# Patient Record
Sex: FEMALE | Race: BLACK OR AFRICAN AMERICAN | NOT HISPANIC OR LATINO | URBAN - METROPOLITAN AREA
[De-identification: names, ages, dates, MRNs, and addresses within clinical notes are randomized per-mention and may not be internally consistent; named-entity substitution may affect disease eponyms.]

---

## 2023-12-03 DIAGNOSIS — M25.831 ULNAR ABUTMENT SYNDROME, RIGHT: ICD-10-CM

## 2023-12-04 PROBLEM — M25.831 ULNAR ABUTMENT SYNDROME, RIGHT: Status: ACTIVE | Noted: 2023-12-03

## 2023-12-13 ENCOUNTER — ANESTHESIA EVENT (OUTPATIENT)
Dept: OPERATING ROOM | Facility: HOSPITAL | Age: 48
End: 2023-12-13
Payer: COMMERCIAL

## 2023-12-14 ENCOUNTER — ANESTHESIA (OUTPATIENT)
Dept: OPERATING ROOM | Facility: HOSPITAL | Age: 48
End: 2023-12-14
Payer: COMMERCIAL

## 2023-12-14 ENCOUNTER — APPOINTMENT (OUTPATIENT)
Dept: RADIOLOGY | Facility: HOSPITAL | Age: 48
End: 2023-12-14
Payer: COMMERCIAL

## 2023-12-14 ENCOUNTER — HOSPITAL ENCOUNTER (OUTPATIENT)
Facility: HOSPITAL | Age: 48
Setting detail: OUTPATIENT SURGERY
Discharge: HOME | End: 2023-12-14
Attending: ORTHOPAEDIC SURGERY | Admitting: ORTHOPAEDIC SURGERY
Payer: COMMERCIAL

## 2023-12-14 VITALS
HEART RATE: 82 BPM | HEIGHT: 61 IN | WEIGHT: 197.97 LBS | RESPIRATION RATE: 14 BRPM | BODY MASS INDEX: 37.38 KG/M2 | SYSTOLIC BLOOD PRESSURE: 135 MMHG | TEMPERATURE: 97.3 F | DIASTOLIC BLOOD PRESSURE: 74 MMHG | OXYGEN SATURATION: 98 %

## 2023-12-14 DIAGNOSIS — M25.831 ULNAR ABUTMENT SYNDROME, RIGHT: Primary | ICD-10-CM

## 2023-12-14 DIAGNOSIS — G89.18 POST-OP PAIN: ICD-10-CM

## 2023-12-14 PROBLEM — E66.9 OBESITY: Status: ACTIVE | Noted: 2023-12-14

## 2023-12-14 PROCEDURE — 76000 FLUOROSCOPY <1 HR PHYS/QHP: CPT | Mod: RT

## 2023-12-14 PROCEDURE — 7100000001 HC RECOVERY ROOM TIME - INITIAL BASE CHARGE: Performed by: ORTHOPAEDIC SURGERY

## 2023-12-14 PROCEDURE — 64417 NJX AA&/STRD AX NERVE IMG: CPT | Performed by: ANESTHESIOLOGY

## 2023-12-14 PROCEDURE — 25390 SHORTEN RADIUS OR ULNA: CPT | Performed by: ORTHOPAEDIC SURGERY

## 2023-12-14 PROCEDURE — 3700000001 HC GENERAL ANESTHESIA TIME - INITIAL BASE CHARGE: Performed by: ORTHOPAEDIC SURGERY

## 2023-12-14 PROCEDURE — 2500000004 HC RX 250 GENERAL PHARMACY W/ HCPCS (ALT 636 FOR OP/ED): Performed by: ORTHOPAEDIC SURGERY

## 2023-12-14 PROCEDURE — 7100000002 HC RECOVERY ROOM TIME - EACH INCREMENTAL 1 MINUTE: Performed by: ORTHOPAEDIC SURGERY

## 2023-12-14 PROCEDURE — 2500000004 HC RX 250 GENERAL PHARMACY W/ HCPCS (ALT 636 FOR OP/ED): Performed by: NURSE ANESTHETIST, CERTIFIED REGISTERED

## 2023-12-14 PROCEDURE — 2780000003 HC OR 278 NO HCPCS: Performed by: ORTHOPAEDIC SURGERY

## 2023-12-14 PROCEDURE — 2500000004 HC RX 250 GENERAL PHARMACY W/ HCPCS (ALT 636 FOR OP/ED): Performed by: ANESTHESIOLOGY

## 2023-12-14 PROCEDURE — 7100000009 HC PHASE TWO TIME - INITIAL BASE CHARGE: Performed by: ORTHOPAEDIC SURGERY

## 2023-12-14 PROCEDURE — 3700000002 HC GENERAL ANESTHESIA TIME - EACH INCREMENTAL 1 MINUTE: Performed by: ORTHOPAEDIC SURGERY

## 2023-12-14 PROCEDURE — A25390 PR OSTEOPLASTY,RADIUS OR ULNA,SHORTEN: Performed by: NURSE ANESTHETIST, CERTIFIED REGISTERED

## 2023-12-14 PROCEDURE — 2500000001 HC RX 250 WO HCPCS SELF ADMINISTERED DRUGS (ALT 637 FOR MEDICARE OP): Performed by: ANESTHESIOLOGY

## 2023-12-14 PROCEDURE — 3600000008 HC OR TIME - EACH INCREMENTAL 1 MINUTE - PROCEDURE LEVEL THREE: Performed by: ORTHOPAEDIC SURGERY

## 2023-12-14 PROCEDURE — 7100000010 HC PHASE TWO TIME - EACH INCREMENTAL 1 MINUTE: Performed by: ORTHOPAEDIC SURGERY

## 2023-12-14 PROCEDURE — A4217 STERILE WATER/SALINE, 500 ML: HCPCS | Performed by: ORTHOPAEDIC SURGERY

## 2023-12-14 PROCEDURE — 64415 NJX AA&/STRD BRCH PLXS IMG: CPT | Performed by: ANESTHESIOLOGY

## 2023-12-14 PROCEDURE — 3600000003 HC OR TIME - INITIAL BASE CHARGE - PROCEDURE LEVEL THREE: Performed by: ORTHOPAEDIC SURGERY

## 2023-12-14 PROCEDURE — C1713 ANCHOR/SCREW BN/BN,TIS/BN: HCPCS | Performed by: ORTHOPAEDIC SURGERY

## 2023-12-14 PROCEDURE — 2720000007 HC OR 272 NO HCPCS: Performed by: ORTHOPAEDIC SURGERY

## 2023-12-14 PROCEDURE — A25390 PR OSTEOPLASTY,RADIUS OR ULNA,SHORTEN: Performed by: ANESTHESIOLOGY

## 2023-12-14 DEVICE — PLATE TACK: Type: IMPLANTABLE DEVICE | Site: WRIST | Status: NON-FUNCTIONAL

## 2023-12-14 DEVICE — IMPLANTABLE DEVICE: Type: IMPLANTABLE DEVICE | Site: WRIST | Status: FUNCTIONAL

## 2023-12-14 DEVICE — SCREW, HEXALOBE, NON LOCKING, 3.5MM X 14MM: Type: IMPLANTABLE DEVICE | Site: WRIST | Status: FUNCTIONAL

## 2023-12-14 DEVICE — GUIDEWIRE, .054 X 6 IN: Type: IMPLANTABLE DEVICE | Site: WRIST | Status: NON-FUNCTIONAL

## 2023-12-14 DEVICE — SCREW, HEXALOBE, LOCKING, 3.5MM X 12MM: Type: IMPLANTABLE DEVICE | Site: WRIST | Status: FUNCTIONAL

## 2023-12-14 RX ORDER — PROPOFOL 10 MG/ML
INJECTION, EMULSION INTRAVENOUS AS NEEDED
Status: DISCONTINUED | OUTPATIENT
Start: 2023-12-14 | End: 2023-12-14

## 2023-12-14 RX ORDER — ONDANSETRON HYDROCHLORIDE 2 MG/ML
INJECTION, SOLUTION INTRAVENOUS AS NEEDED
Status: DISCONTINUED | OUTPATIENT
Start: 2023-12-14 | End: 2023-12-14

## 2023-12-14 RX ORDER — FENTANYL CITRATE 50 UG/ML
INJECTION, SOLUTION INTRAMUSCULAR; INTRAVENOUS AS NEEDED
Status: DISCONTINUED | OUTPATIENT
Start: 2023-12-14 | End: 2023-12-14

## 2023-12-14 RX ORDER — MIDAZOLAM HYDROCHLORIDE 1 MG/ML
INJECTION, SOLUTION INTRAMUSCULAR; INTRAVENOUS AS NEEDED
Status: DISCONTINUED | OUTPATIENT
Start: 2023-12-14 | End: 2023-12-14

## 2023-12-14 RX ORDER — SODIUM CHLORIDE, SODIUM LACTATE, POTASSIUM CHLORIDE, CALCIUM CHLORIDE 600; 310; 30; 20 MG/100ML; MG/100ML; MG/100ML; MG/100ML
50 INJECTION, SOLUTION INTRAVENOUS CONTINUOUS
Status: DISCONTINUED | OUTPATIENT
Start: 2023-12-14 | End: 2023-12-14 | Stop reason: HOSPADM

## 2023-12-14 RX ORDER — HYDRALAZINE HYDROCHLORIDE 20 MG/ML
5 INJECTION INTRAMUSCULAR; INTRAVENOUS EVERY 30 MIN PRN
Status: DISCONTINUED | OUTPATIENT
Start: 2023-12-14 | End: 2023-12-14 | Stop reason: HOSPADM

## 2023-12-14 RX ORDER — SODIUM CHLORIDE 0.9 G/100ML
IRRIGANT IRRIGATION AS NEEDED
Status: DISCONTINUED | OUTPATIENT
Start: 2023-12-14 | End: 2023-12-14 | Stop reason: HOSPADM

## 2023-12-14 RX ORDER — PROPOFOL 10 MG/ML
INJECTION, EMULSION INTRAVENOUS CONTINUOUS PRN
Status: DISCONTINUED | OUTPATIENT
Start: 2023-12-14 | End: 2023-12-14

## 2023-12-14 RX ORDER — SODIUM CHLORIDE, SODIUM LACTATE, POTASSIUM CHLORIDE, CALCIUM CHLORIDE 600; 310; 30; 20 MG/100ML; MG/100ML; MG/100ML; MG/100ML
100 INJECTION, SOLUTION INTRAVENOUS CONTINUOUS
Status: DISCONTINUED | OUTPATIENT
Start: 2023-12-14 | End: 2023-12-14 | Stop reason: HOSPADM

## 2023-12-14 RX ORDER — KETOROLAC TROMETHAMINE 30 MG/ML
INJECTION, SOLUTION INTRAMUSCULAR; INTRAVENOUS AS NEEDED
Status: DISCONTINUED | OUTPATIENT
Start: 2023-12-14 | End: 2023-12-14

## 2023-12-14 RX ORDER — OXYCODONE HYDROCHLORIDE 5 MG/1
5 TABLET ORAL EVERY 4 HOURS PRN
Status: DISCONTINUED | OUTPATIENT
Start: 2023-12-14 | End: 2023-12-14 | Stop reason: HOSPADM

## 2023-12-14 RX ORDER — DEXAMETHASONE SODIUM PHOSPHATE 4 MG/ML
INJECTION, SOLUTION INTRA-ARTICULAR; INTRALESIONAL; INTRAMUSCULAR; INTRAVENOUS; SOFT TISSUE AS NEEDED
Status: DISCONTINUED | OUTPATIENT
Start: 2023-12-14 | End: 2023-12-14

## 2023-12-14 RX ORDER — ONDANSETRON HYDROCHLORIDE 2 MG/ML
4 INJECTION, SOLUTION INTRAVENOUS ONCE AS NEEDED
Status: DISCONTINUED | OUTPATIENT
Start: 2023-12-14 | End: 2023-12-14 | Stop reason: HOSPADM

## 2023-12-14 RX ORDER — CEFAZOLIN SODIUM 2 G/100ML
2 INJECTION, SOLUTION INTRAVENOUS ONCE
Status: DISCONTINUED | OUTPATIENT
Start: 2023-12-14 | End: 2023-12-14 | Stop reason: HOSPADM

## 2023-12-14 RX ORDER — OXYCODONE HYDROCHLORIDE 5 MG/1
5 TABLET ORAL EVERY 6 HOURS PRN
Qty: 28 TABLET | Refills: 0 | Status: SHIPPED | OUTPATIENT
Start: 2023-12-14 | End: 2023-12-21

## 2023-12-14 RX ORDER — CEFAZOLIN 1 G/1
INJECTION, POWDER, FOR SOLUTION INTRAVENOUS AS NEEDED
Status: DISCONTINUED | OUTPATIENT
Start: 2023-12-14 | End: 2023-12-14

## 2023-12-14 RX ADMIN — ONDANSETRON 4 MG: 2 INJECTION INTRAMUSCULAR; INTRAVENOUS at 14:58

## 2023-12-14 RX ADMIN — FENTANYL CITRATE 100 MCG: 50 INJECTION, SOLUTION INTRAMUSCULAR; INTRAVENOUS at 14:54

## 2023-12-14 RX ADMIN — PROPOFOL 50 MG: 10 INJECTION, EMULSION INTRAVENOUS at 16:03

## 2023-12-14 RX ADMIN — SODIUM CHLORIDE, POTASSIUM CHLORIDE, SODIUM LACTATE AND CALCIUM CHLORIDE: 600; 310; 30; 20 INJECTION, SOLUTION INTRAVENOUS at 14:03

## 2023-12-14 RX ADMIN — PROPOFOL 200 MG: 10 INJECTION, EMULSION INTRAVENOUS at 14:54

## 2023-12-14 RX ADMIN — PROPOFOL 50 MG: 10 INJECTION, EMULSION INTRAVENOUS at 15:45

## 2023-12-14 RX ADMIN — OXYCODONE HYDROCHLORIDE 5 MG: 5 TABLET ORAL at 17:21

## 2023-12-14 RX ADMIN — PROPOFOL 50 MG: 10 INJECTION, EMULSION INTRAVENOUS at 16:00

## 2023-12-14 RX ADMIN — DEXAMETHASONE SODIUM PHOSPHATE 4 MG: 4 INJECTION, SOLUTION INTRAMUSCULAR; INTRAVENOUS at 14:58

## 2023-12-14 RX ADMIN — CEFAZOLIN 2 G: 1 INJECTION, POWDER, FOR SOLUTION INTRAMUSCULAR; INTRAVENOUS at 14:54

## 2023-12-14 RX ADMIN — MIDAZOLAM HYDROCHLORIDE 5 MG: 1 INJECTION, SOLUTION INTRAMUSCULAR; INTRAVENOUS at 14:08

## 2023-12-14 RX ADMIN — FENTANYL CITRATE 50 MCG: 50 INJECTION, SOLUTION INTRAMUSCULAR; INTRAVENOUS at 15:39

## 2023-12-14 RX ADMIN — DEXAMETHASONE SODIUM PHOSPHATE 4 MG: 4 INJECTION, SOLUTION INTRAMUSCULAR; INTRAVENOUS at 15:30

## 2023-12-14 RX ADMIN — MIDAZOLAM HYDROCHLORIDE 2 MG: 1 INJECTION, SOLUTION INTRAMUSCULAR; INTRAVENOUS at 14:50

## 2023-12-14 RX ADMIN — PROPOFOL 40 MG: 10 INJECTION, EMULSION INTRAVENOUS at 15:30

## 2023-12-14 RX ADMIN — KETOROLAC TROMETHAMINE 30 MG: 30 INJECTION, SOLUTION INTRAMUSCULAR at 15:55

## 2023-12-14 RX ADMIN — FENTANYL CITRATE 50 MCG: 50 INJECTION, SOLUTION INTRAMUSCULAR; INTRAVENOUS at 15:47

## 2023-12-14 RX ADMIN — PROPOFOL 50 MCG/KG/MIN: 10 INJECTION, EMULSION INTRAVENOUS at 15:30

## 2023-12-14 ASSESSMENT — PAIN SCALES - GENERAL
PAINLEVEL_OUTOF10: 3
PAINLEVEL_OUTOF10: 0 - NO PAIN
PAINLEVEL_OUTOF10: 3
PAINLEVEL_OUTOF10: 2
PAIN_LEVEL: 0

## 2023-12-14 ASSESSMENT — PAIN - FUNCTIONAL ASSESSMENT
PAIN_FUNCTIONAL_ASSESSMENT: 0-10

## 2023-12-14 ASSESSMENT — COLUMBIA-SUICIDE SEVERITY RATING SCALE - C-SSRS
6. HAVE YOU EVER DONE ANYTHING, STARTED TO DO ANYTHING, OR PREPARED TO DO ANYTHING TO END YOUR LIFE?: NO
1. IN THE PAST MONTH, HAVE YOU WISHED YOU WERE DEAD OR WISHED YOU COULD GO TO SLEEP AND NOT WAKE UP?: NO

## 2023-12-14 NOTE — ANESTHESIA POSTPROCEDURE EVALUATION
Patient: Augustus Gallardo    Procedure Summary       Date: 12/14/23 Room / Location: [facility] OR 17 / [facility] OR    Anesthesia Start: 1420 Anesthesia Stop: 1622    Procedure: Right Wrist Ulnar Shortening Osteotomy (Right: Wrist) Diagnosis:       Ulnar abutment syndrome, right      (Ulnar abutment syndrome, right [M25.831])    Surgeons: Soren Jameson MD Responsible Provider: Komal Reddy MD    Anesthesia Type: general ASA Status: 2            Anesthesia Type: general    Vitals Value Taken Time   /58 12/14/23 1622   Temp 36 12/14/23 1622   Pulse 100 12/14/23 1622   Resp 15 12/14/23 1622   SpO2 96 12/14/23 1622       Anesthesia Post Evaluation    Patient location during evaluation: PACU  Patient participation: complete - patient participated  Level of consciousness: awake  Pain score: 0  Pain management: adequate  Airway patency: patent  Cardiovascular status: acceptable  Respiratory status: acceptable and face mask  Hydration status: acceptable  Postoperative Nausea and Vomiting: none        There were no known notable events for this encounter.

## 2023-12-14 NOTE — ANESTHESIA PROCEDURE NOTES
Airway  Date/Time: 12/14/2023 2:52 PM  Urgency: elective      Staffing  Performed: CRNA   Authorized by: Komal Reddy MD    Performed by: APURVA Maya-CRNA  Patient location during procedure: OR    Indications and Patient Condition  Indications for airway management: anesthesia  Spontaneous ventilation: present  Sedation level: deep  Preoxygenated: yes  Patient position: sniffing  MILS maintained throughout  Mask difficulty assessment: 1 - vent by mask  No planned trial extubation    Final Airway Details  Final airway type: supraglottic airway      Successful airway: Size 4     Number of attempts at approach: 1  Number of other approaches attempted: 0    Additional Comments  [email or web address]

## 2023-12-14 NOTE — ANESTHESIA PREPROCEDURE EVALUATION
"Patient: Augustus Gallardo    Procedure Information       Date/Time: 23 1430    Procedure: Right Wrist Ulnar Shortening Osteotomy (Right: Wrist)    Location: [facility] A OR 17 / [facility] OR    Surgeons: Soren Jameson MD                                                    Pre-Anesthesia Evaluation      Augustus Gallardo is a 48 y.o. female who presents for procedure stated above.     Medical History reviewed  SVT - took metoprolol till 2018. No episodes since. Believed to be related to dehydration and increased caffeine intake.    Past Surgical History:   Procedure Laterality Date     SECTION, CLASSIC      x3    TUBAL LIGATION      WRIST SURGERY Right     reviewed    Social History reviewed  She reports that she has never smoked. She has never used smokeless tobacco. She reports that she does not drink alcohol and does not use drugs.    Allergies and Medications reviewed  Patient has no known allergies.    No current outpatient medications        Relevant Results reviewed  No results found for: \"STAPHMRSASCR\"  No results found for: \"ABO\"  No results found for: \"WBC\", \"HGB\", \"HCT\", \"PLT\"    Chemistry    No results found for: \"NA\", \"K\", \"CL\", \"CO2\", \"BUN\", \"CREATININE\", \"GLU\" No results found for: \"CALCIUM\", \"ALKPHOS\", \"AST\", \"ALT\", \"BILITOT\"       No results found for: \"PROTIME\", \"INR\", \"PTT\"  No results found for this or any previous visit (from the past 4464 hour(s)).  No results found for this or any previous visit.  No results found for this or any previous visit.  No results found for this or any previous visit from the past 1000 days.     No image results found.    Vitals  Visit Vitals  /89   Pulse 85   Temp 36.7 °C (98.1 °F) (Temporal)   Resp 16   Ht 1.549 m (5' 1\")   Wt 89.8 kg (197 lb 15.6 oz)   LMP 2023 (Exact Date)   SpO2 100%   BMI 37.41 kg/m²   Smoking Status Never   BSA 1.97 m²                          Relevant Problems   Anesthesia (within normal limits)      Endocrine "   (+) Obesity       Clinical information reviewed:    Allergies  Meds   Med Hx  Surg Hx  OB Status  Fam Hx          NPO Detail:  NPO/Void Status  Date of Last Liquid: 12/14/23  Time of Last Liquid: 1020  Date of Last Solid: 12/13/23  Time of Last Solid: 2030         Physical Exam    Airway  Mallampati: II  TM distance: >3 FB  Neck ROM: full     Cardiovascular - normal exam     Dental - normal exam     Pulmonary - normal exam     Abdominal            Anesthesia Plan    ASA 2     general     intravenous induction   Anesthetic plan and risks discussed with patient.    Plan discussed with CRNA and CAA.

## 2023-12-14 NOTE — ANESTHESIA PROCEDURE NOTES
Peripheral Block    Patient location during procedure: pre-op  Start time: 12/14/2023 2:08 PM  End time: 12/14/2023 2:15 PM  Reason for block: at surgeon's request and post-op pain management  Staffing  Performed: attending   Authorized by: Komal Reddy MD    Performed by: Komal Reddy MD  Preanesthetic Checklist  Completed: patient identified, IV checked, site marked, risks and benefits discussed, surgical consent, monitors and equipment checked, pre-op evaluation and timeout performed   Timeout performed at: 12/14/2023 2:07 PM  Peripheral Block  Patient position: laying flat  Prep: ChloraPrep and site prepped and draped  Patient monitoring: heart rate, cardiac monitor and continuous pulse ox  Block type: axillary and brachial plexus  Laterality: right  Injection technique: single-shot  Guidance: nerve stimulator, ultrasound guided and image saved to chart.  Local infiltration: lidocaine  Infiltration strength: 1 %  Dose: 2 mL  Needle  Needle type: short-bevel   Needle gauge: 22 G  Needle length: 5 cm  Needle localization: anatomical landmarks, nerve stimulator and ultrasound guidance  Test dose: negative  Assessment  Injection assessment: negative aspiration for heme, no paresthesia on injection, incremental injection and local visualized surrounding nerve on ultrasound  Paresthesia pain: none  Heart rate change: no  Slow fractionated injection: no  Additional Notes  Pre-medication with Versed 5 mg intravenously.  Following a focussed neurological history, procedure-related and patient-specific complications were discussed. Verbal consent was provided by the patient and/or surrogate decision maker. Anticoagulation (if any) was held per YONAS guidelines.  Aseptic prep and draping was performed. A 22 G echogenic needle was used with a nerve stimulator. No Evoked Motor Response was visible at < 0.3 mA. Using in-plane needle visualization, 40 ml of 0.375% Bupivacaine with epi 5 mcg/ml and decadron 4 mg was injected  extraneurally in 5 ml increments.There was no resistance to injection and appropriate injection pressures were maintained based on tactile feedback. Throughout the procedure, there was consistent and meaningful verbal communication with the patient. Post procedure vital signs were stable and no immediate complications were noted. PACU will provide written instructions that outline the specific precautions to be taken when caring for an akinetic, insensate extremity.

## 2023-12-14 NOTE — H&P
[facility] Department of Orthopaedic Surgery   Surgical History & Physical >30 Days  12/14/2023    Reason for Surgery: Right wrist pain d/t TFCC tear refractory to TFCC repair  Planned Procedure: Right wrist ulnar shortening osteotomy    History & Physical Reviewed:  I have reviewed the History and Physical obtained 4/20/2023. Relevant findings and updates are noted below:    Patient is here for the above diagnosis. Symptoms include pain, discomfort, decreased quality of life in the operative extremity due to the above diagnosis. No significant changes from prior office visit. No numbness or tingling. Denies chest pain or shortness of breath.     Home medications were reviewed with significant updates noted below:  No significant changes.    Allergies:  No Known Allergies    Review of Systems:  Review of Systems   Gen: Denies recent weight loss  Neuro: Denies recent confusion  Ophtho: Denies changes in vision  ENT: Denies changes in hearing  Endo: Denies weight loss/weight gain  CV: Denies chest pain  Resp: Denies shortness of breath  GI: Denies melena/hematochezia  : Denies painful urination  MSK: Per above HPI  Heme: No abnormal bleeding  Psych: Denies hallucinations    Upper extremity:   -Skin intact.   -Tender at site of injury with painful ROM.  -Fires axillary/AIN/PIN/ulnar distributions  -SILT axillary/radial/median/ulnar distributions  -Hand warm, well perfused  -Palpable radial pulse, cap refill brisk  -Compartments soft and compressible     ERAS patient?: No    COVID-19 Risk Consent:   Surgeon has reviewed the key risks related to [place] COVID-19 and subsequent sequelae.       12/14/23 at 1:38 PM - Rafa Daniel DO

## 2023-12-14 NOTE — OP NOTE
Right Wrist Ulnar Shortening Osteotomy (R) Operative Note     Date: 2023  OR Location: [facility] A OR    Name: Augustus Gallardo, : 1975, Age: 48 y.o., MRN: [id number], Sex: female    Diagnosis  Pre-op Diagnosis     * Ulnar abutment syndrome, right [M25.831] Post-op Diagnosis     * Ulnar abutment syndrome, right [M25.831]     Procedures  Right Wrist Ulnar Shortening Osteotomy  98421 - NC OSTEOPLASTY RADIUS/ULNA SHORTENING      Surgeons      * Soren Jameson - Primary    Resident/Fellow/Other Assistant:  Surgeon(s) and Role:     * Rafa Daniel DO - Resident - Assisting    Procedure Summary  Anesthesia: General  ASA: II  Anesthesia Staff: Anesthesiologist: Komal Reddy MD  CRNA: APURVA Maya-CRNA; ALEXIA Daigle  Estimated Blood Loss: 1 mL  Intra-op Medications:   Medication Name Total Dose   sodium chloride 0.9 % irrigation solution 1,000 mL              Anesthesia Record               Intraprocedure I/O Totals          Intake    Propofol Drip 0.00 mL    The total shown is the total volume documented since Anesthesia Start was filed.    lactated Ringer's infusion 700.00 mL    Total Intake 700 mL          Specimen: No specimens collected     Staff:   Circulator: Fiona Yoo RN  Relief Circulator: Jonny Salas RN  Relief Scrub: Lauren Vasquez RN  Scrub Person: Clarissa Causey         Drains and/or Catheters: * None in log *    Tourniquet Times:   * Missing tourniquet times found for documented tourniquets in lo *     Implants:  Implants       Type Name Action Serial No.      Screw GUIDEWIRE, .054 X 6 IN - SN/A - [id number] Used, Not Implanted N/A     Screw PLATE TACK - SN/A - [id number] Used, Not Implanted N/A     Screw PLATE, ULNA SHORTENING, 6 HOLE - SN/A - [id number] Implanted N/A     Screw SCREW, HEXALOBE, NON LOCKING, 3.5MM X 14MM - SN/A - [id number] Implanted N/A     Screw SCREW, HEXALOBE, NON-LOCKING, 3.5MM X 16MM - SN/A - [id number] Implanted N/A     Screw SCREW,  HEXALOBE, LOCKING, 3.5MM X 12MM - SN/A - [id number] Implanted N/A     Screw SCREW, HEXALOBE, LOCKING, 3.5MM X 14MM - SN/A - [id number] Implanted N/A              Findings: Ulnocarpal impaction syndrome right wrist    Indications: Augustus Gallardo is an 48 y.o. female who is having surgery for Ulnar abutment syndrome, right [M25.831].      The patient was seen in the preoperative area. The risks, benefits, complications, treatment options, non-operative alternatives, expected recovery and outcomes were discussed with the patient. The possibilities of reaction to medication, pulmonary aspiration, injury to surrounding structures, bleeding, recurrent infection, the need for additional procedures, failure to diagnose a condition, and creating a complication requiring transfusion or operation were discussed with the patient. The patient concurred with the proposed plan, giving informed consent.  The site of surgery was properly noted/marked if necessary per policy. The patient has been actively warmed in preoperative area. Preoperative antibiotics have been ordered and given within 1 hours of incision. Venous thrombosis prophylaxis have been ordered including bilateral sequential compression devices    Procedure Details:   48-year-old right-hand-dominant female previously sustained a work-related injury to her right wrist triangular fibrocartilage complex.  Surgery was performed to repair the TFCC but she has had consistent ulnar-sided wrist pain despite the surgery.  She has radiographic evidence of ulnar positive variance which increases the likelihood that she will have a poor outcome following a TFCC repair.  After failing attempted conservative management for her persistent ulnar-sided pain she presents now for ulnar shortening osteotomy.  Even though her initial injury was all related to [facility].  They have denied treatment for her ulnar shortening procedure today and so she is doing this procedure under her own  insurance.  Preoperatively the right arm was identified and marked for surgery.  Informed consent process was completed.    Patient brought to the operating room placed supine on the operating table.  Timeout procedure was performed to verify the correct patient procedure and operative site.  Intravenous antibiotics were administered.  General anesthetic is initiated by the anesthesia service.  Right upper extremity prepped and draped in usual sterile fashion.  Limb was exsanguinated and tourniquet inflated to 250 mmHg.    We made a longitudinal incision along the subcutaneous border of the ulna shaft extending out to the level of the ulnar neck.  We dissected down to the deep fascia.  We then incised the fascia directly over the ulna allowing us to retract the FCU volarly and the ECU dorsally.  We left the periosteal sleeve around the ulna intact.  Deep retractors were placed.  We then placed the Acumed ulnar shortening osteotomy plate on the volar surface of the ulna.  It was secured distally with a 3.5 mm locking screw and proximally with a sliding peg through the oblong hole.  Once the plate was positioned appropriately we then attached the cutting guide.  Once this was secured we made our first cut through the ulna.  We then adjusted the cutting guide to make a 4 mm shortening.  Once the cutting guide was secured to its new position and the second cut was made we removed the cutting guide and removed the wafer of bone leaving us a defect of about 4 mm.  We then compressed across the defect obtaining excellent opposition across the osteotomy.  The compression clamp was secured.  Using AO technique we then secured the plate proximally followed by placement of a interfragmentary compression screw through the central hole of the plate.  The compression instruments were removed.  Additional screws were placed in the proximal and distal aspect of the plate.  Final fluoroscopic images were obtained which demonstrated  excellent joint leveling with appropriate implant position.    The wound was copiously irrigated.  The fascia was closed over the ulna to help provide soft tissue coverage to the plate.  The wound was closed interrupted fashion.  Sterile bandage was applied.  The tourniquet was deflated.  The patient was placed into a well padded sugar-tong splint.  She was awoken from her anesthetic and transferred to recovery in stable condition.    Postoperatively she will be discharged home is comfortable.  She will remain in her splint until she returns to clinic in 2 weeks at which point we will transition her into a removable splint and allow her to begin wound care and gentle on resisted range of motion activities of the forearm wrist and hand.  We will allow her to return to more heavier resisted activities after we have radiographic evidence of early consolidation across the osteotomy site.        Complications:  None; patient tolerated the procedure well.    Disposition: PACU - hemodynamically stable.  Condition: stable         Additional Details:      Attending Attestation: I was present and scrubbed for the entire procedure.    Soren Jameson  Phone Number: [phone]

## 2024-01-02 ENCOUNTER — OFFICE VISIT (OUTPATIENT)
Dept: ORTHOPEDIC SURGERY | Facility: CLINIC | Age: 49
End: 2024-01-02
Payer: COMMERCIAL

## 2024-01-02 VITALS — HEIGHT: 61 IN | WEIGHT: 197 LBS | BODY MASS INDEX: 37.19 KG/M2

## 2024-01-02 DIAGNOSIS — M25.831 ULNOCARPAL IMPACTION SYNDROME, RIGHT: Primary | ICD-10-CM

## 2024-01-02 PROCEDURE — 99024 POSTOP FOLLOW-UP VISIT: CPT | Performed by: ORTHOPAEDIC SURGERY

## 2024-01-02 PROCEDURE — L3984 UPPER EXT FX ORTHOSIS WRIST: HCPCS | Performed by: ORTHOPAEDIC SURGERY

## 2024-01-02 PROCEDURE — 1036F TOBACCO NON-USER: CPT | Performed by: ORTHOPAEDIC SURGERY

## 2024-01-02 RX ORDER — OXYCODONE AND ACETAMINOPHEN 5; 325 MG/1; MG/1
1 TABLET ORAL EVERY 6 HOURS PRN
Qty: 28 TABLET | Refills: 0 | Status: SHIPPED | OUTPATIENT
Start: 2024-01-02 | End: 2024-01-09

## 2024-01-02 ASSESSMENT — PAIN - FUNCTIONAL ASSESSMENT: PAIN_FUNCTIONAL_ASSESSMENT: 0-10

## 2024-01-02 ASSESSMENT — PAIN SCALES - GENERAL: PAINLEVEL_OUTOF10: 5 - MODERATE PAIN

## 2024-01-02 ASSESSMENT — PAIN DESCRIPTION - DESCRIPTORS: DESCRIPTORS: ACHING

## 2024-01-02 NOTE — PROGRESS NOTES
Date of surgery: 12/14/2023         Surgery(s) performed: Right Wrist Ulnar Shortening Osteotomy        Patient presents today for her first post-operative visit, performed on 12/14/2023. Pain is well-controlled. No new complaints          Exam findings: Surgical incision is well-healed. Minimal swelling, good digital range of motion but she is a bit apprehensive for forearm range of motion activities.    Impression:  Right Wrist Ulno-Carpal Impression Syndrome    Plan: Today she placed into an Exos removable splint that she can take of for hygiene  purposes and light activities, but to be worn for  weight bearing and twisting type maneuvers. Follow-up in 4 weeks for repeat clinical examination and X-ray of the right  forearm.    Patient was prescribed a Exos forearm-based splint for ulnocarpal impaction syndrome. The patient has weakness, instability and/or deformity of their right wrist which requires stabilization from this orthosis to improve their function.      Verbal and written instructions for the use, wear schedule, cleaning and application of this item were given.  Patient was instructed that should the brace result in increased pain, decreased sensation, increased swelling, or an overall worsening of their medical condition, to please contact our office immediately.     Orthotic management and training was provided for skin care, modifications due to healing tissues, edema changes, interruption in skin integrity, and safety precautions with the orthosis.        Soren Jameson MD          [facility] [employer]     Department of Orthopaedic Surgery     Chief of Hand and Upper Extremity Surgery     [facility]       Scribe Attestation  By signing my name below, I Rose Ricarda, Scribe   attest that this documentation has been prepared under the direction and in the presence of Soren Jameson MD.

## 2024-02-01 ENCOUNTER — APPOINTMENT (OUTPATIENT)
Dept: ORTHOPEDIC SURGERY | Facility: CLINIC | Age: 49
End: 2024-02-01
Payer: COMMERCIAL

## 2024-02-02 DIAGNOSIS — M25.831 ULNAR ABUTMENT SYNDROME, RIGHT: Primary | ICD-10-CM

## 2024-02-05 ENCOUNTER — HOSPITAL ENCOUNTER (OUTPATIENT)
Dept: RADIOLOGY | Facility: CLINIC | Age: 49
Discharge: HOME | End: 2024-02-05
Payer: COMMERCIAL

## 2024-02-05 ENCOUNTER — OFFICE VISIT (OUTPATIENT)
Dept: ORTHOPEDIC SURGERY | Facility: CLINIC | Age: 49
End: 2024-02-05
Payer: COMMERCIAL

## 2024-02-05 VITALS — HEIGHT: 61 IN | WEIGHT: 197 LBS | BODY MASS INDEX: 37.19 KG/M2

## 2024-02-05 DIAGNOSIS — M25.831 ULNOCARPAL IMPACTION SYNDROME, RIGHT: Primary | ICD-10-CM

## 2024-02-05 DIAGNOSIS — M25.831 ULNAR ABUTMENT SYNDROME, RIGHT: ICD-10-CM

## 2024-02-05 PROCEDURE — 73090 X-RAY EXAM OF FOREARM: CPT | Mod: RIGHT SIDE | Performed by: RADIOLOGY

## 2024-02-05 PROCEDURE — 73090 X-RAY EXAM OF FOREARM: CPT | Mod: RT

## 2024-02-05 PROCEDURE — 99024 POSTOP FOLLOW-UP VISIT: CPT | Performed by: ORTHOPAEDIC SURGERY

## 2024-02-05 ASSESSMENT — PAIN SCALES - GENERAL: PAINLEVEL_OUTOF10: 5 - MODERATE PAIN

## 2024-02-05 ASSESSMENT — PAIN DESCRIPTION - DESCRIPTORS: DESCRIPTORS: ACHING;SORE

## 2024-02-05 ASSESSMENT — PAIN - FUNCTIONAL ASSESSMENT: PAIN_FUNCTIONAL_ASSESSMENT: 0-10

## 2024-02-05 NOTE — PROGRESS NOTES
Date of surgery: 12/14/23          Surgery(s) performed: Right wrist ulnar shortening osteotomy       Patient presents today for her second post-operative visit, performed on 02/05/24. She is still a bit achy today. But overall doing well with OTC medications.    Exam findings: Well healed surgical incision along subcutaneous border of the right ulna.   Minimal tenderness to palpation at the surgical site. Able to demonstrate 70 degrees each of pronation and supination. Full digital range of motion. Sensation subjectively normal.      X-rays of the right wrist taken 02/05/24 reveal adequate shorting of the ulna. Osteotomy site still visible particularly along the radial border of the ulnar shaft. Healing well right under the plate. No evidence of screw loosening      Impression: Ulnar carpal impatcion syndrome       Plan: She may wean from her splint. She may begin progressive use of the hand, but should avoid forecful twisting and weight bearing. She will follow up in 1 month for a repeat clinical examination and repeat x-rays of her right forearm.          Soren Jameson MD          [facility] [employer]     Department of Orthopaedic Surgery     Chief of Hand and Upper Extremity Surgery     [facility]      Scribe Attestation  By signing my name below, Amelia FERREIRA Scribe   attest that this documentation has been prepared under the direction and in the presence of Soren Jameson MD.

## 2024-03-08 DIAGNOSIS — M25.831 ULNAR ABUTMENT SYNDROME, RIGHT: Primary | ICD-10-CM

## 2024-03-11 ENCOUNTER — OFFICE VISIT (OUTPATIENT)
Dept: ORTHOPEDIC SURGERY | Facility: CLINIC | Age: 49
End: 2024-03-11
Payer: COMMERCIAL

## 2024-03-11 ENCOUNTER — HOSPITAL ENCOUNTER (OUTPATIENT)
Dept: RADIOLOGY | Facility: CLINIC | Age: 49
Discharge: HOME | End: 2024-03-11
Payer: COMMERCIAL

## 2024-03-11 VITALS — HEIGHT: 61 IN | WEIGHT: 197 LBS | BODY MASS INDEX: 37.19 KG/M2

## 2024-03-11 DIAGNOSIS — M25.831 ULNAR ABUTMENT SYNDROME, RIGHT: ICD-10-CM

## 2024-03-11 DIAGNOSIS — M25.831 ULNOCARPAL IMPACTION SYNDROME, RIGHT: Primary | ICD-10-CM

## 2024-03-11 PROCEDURE — 73090 X-RAY EXAM OF FOREARM: CPT | Mod: RT

## 2024-03-11 PROCEDURE — 99024 POSTOP FOLLOW-UP VISIT: CPT | Performed by: ORTHOPAEDIC SURGERY

## 2024-03-11 PROCEDURE — 73090 X-RAY EXAM OF FOREARM: CPT | Mod: RIGHT SIDE | Performed by: STUDENT IN AN ORGANIZED HEALTH CARE EDUCATION/TRAINING PROGRAM

## 2024-03-11 ASSESSMENT — PAIN DESCRIPTION - DESCRIPTORS: DESCRIPTORS: ACHING

## 2024-03-11 ASSESSMENT — PAIN SCALES - GENERAL: PAINLEVEL_OUTOF10: 3

## 2024-03-11 ASSESSMENT — PAIN - FUNCTIONAL ASSESSMENT: PAIN_FUNCTIONAL_ASSESSMENT: 0-10

## 2024-03-11 NOTE — LETTER
March 11, 2024     Patient: Augustus Gallardo   YOB: 1975   Date of Visit: 3/11/2024       To Whom It May Concern:    It is my medical opinion that Augustus Gallardo may return to full duty immediately with no restrictions.    If you have any questions or concerns, please don't hesitate to call.         Sincerely,        Soren Jameson MD    CC: No Recipients

## 2024-03-11 NOTE — LETTER
March 11, 2024     Robe Angeles MD  [address]  [address]  [place] [address]    Patient: Augustus Gallardo   YOB: 1975   Date of Visit: 3/11/2024       Dear Dr. Robe Angeles MD:    Thank you for referring Augustus Gallardo to me for evaluation. Below are my notes for this consultation.  If you have questions, please do not hesitate to call me. I look forward to following your patient along with you.       Sincerely,     Soren Jameson MD      CC: No Recipients  ______________________________________________________________________________________    Date of surgery: 12/14/23         Surgery(s) performed: Right wrist ulnar shortening osteotomy.        Patient presents today for her third post-operative visit, performed on 03/11/24.   Overall doing well. Significant improvement in pain, but still a bit weak with  strength. Is currently in a job search process. Vocational resources through the [facility] were denied.       X-rays of the right forearm taken 03/11/24 demonstrate healed osteotomy with retained surgical implants     Exam findings: Healed surgical incision. Full forearm range of motion and symmetric wrist range of motion bilaterally. Full digital range of motion but has some weakness with terminal  strength on the right hand when compared to the left.      Impression: Ulnar carpal impaction syndrome      Plan: At this point I have no restrictions for her. She may progress with her return to activities as tolerated. I believe that her  strength will continue to improve with time. Continue to follow up with her physician of record as needed. I am happy to see her back in the future if she has any further concerns.           Soren Jameson MD          [facility] [employer]     Department of Orthopaedic Surgery     Chief of Hand and Upper Extremity Surgery     [facility]      Scribe Attestation  By signing my name below, I, Mehdi Lira   attest that this documentation has been prepared under the direction and in the presence of Soren Jameson MD.

## 2024-03-11 NOTE — PROGRESS NOTES
Date of surgery: 12/14/23         Surgery(s) performed: Right wrist ulnar shortening osteotomy.        Patient presents today for her third post-operative visit, performed on 03/11/24.   Overall doing well. Significant improvement in pain, but still a bit weak with  strength. Is currently in a job search process. Vocational resources through the [facility] were denied.       X-rays of the right forearm taken 03/11/24 demonstrate healed osteotomy with retained surgical implants     Exam findings: Healed surgical incision. Full forearm range of motion and symmetric wrist range of motion bilaterally. Full digital range of motion but has some weakness with terminal  strength on the right hand when compared to the left.      Impression: Ulnar carpal impaction syndrome      Plan: At this point I have no restrictions for her. She may progress with her return to activities as tolerated. I believe that her  strength will continue to improve with time. Continue to follow up with her physician of record as needed. I am happy to see her back in the future if she has any further concerns.           Soren Jameson MD          [facility] [employer]     Department of Orthopaedic Surgery     Chief of Hand and Upper Extremity Surgery     [facility]     Scribe Attestation  By signing my name below, IAmelia Scribe   attest that this documentation has been prepared under the direction and in the presence of Soren Jameson MD.

## (undated) DEVICE — CORD, CAUTERY, BIOPOLAR FORCEP, 12FT

## (undated) DEVICE — SUTURE, ETHIBOND XTRA, 3-0, 30 IN, SH

## (undated) DEVICE — Device

## (undated) DEVICE — GLOVE, SURGICAL, PROTEXIS PI BLUE W/NEUTHERA, 8.0, PF, LF

## (undated) DEVICE — APPLICATOR, CHLORAPREP, W/ORANGE TINT, 26ML

## (undated) DEVICE — SUTURE, MONOCRYL, 4-0, 18 IN, PS2, UNDYED

## (undated) DEVICE — DRILL, QUICK RELEASE, 2.8MM

## (undated) DEVICE — GLOVE, SURGICAL, PROTEXIS PI MICRO, 7.5, PF, LF

## (undated) DEVICE — SUTURE, VICRYL, 2-0, 27 IN, SH, UNDYED

## (undated) DEVICE — SLING, ARM, LARGE

## (undated) DEVICE — SLEEVE, SCD EXPRESS, KNEE LENGTH-MEDIUM

## (undated) DEVICE — SUTURE, VICRYL, 3-0, 27 IN, SH

## (undated) DEVICE — KIT, MINOR, DOUBLE BASIN

## (undated) DEVICE — BLADE, OPHTHALMIC, BEAVER, MINI, SHARP ONE SIDE

## (undated) DEVICE — STRIP, SKIN CLOSURE, STERI STRIP, REINFORCED, 0.5 X 4 IN